# Patient Record
Sex: FEMALE | Race: WHITE | NOT HISPANIC OR LATINO | URBAN - METROPOLITAN AREA
[De-identification: names, ages, dates, MRNs, and addresses within clinical notes are randomized per-mention and may not be internally consistent; named-entity substitution may affect disease eponyms.]

---

## 2021-08-05 ENCOUNTER — WEB ENCOUNTER (OUTPATIENT)
Dept: URBAN - METROPOLITAN AREA CLINIC 74 | Facility: CLINIC | Age: 31
End: 2021-08-05

## 2021-08-05 ENCOUNTER — OFFICE VISIT (OUTPATIENT)
Dept: URBAN - METROPOLITAN AREA CLINIC 74 | Facility: CLINIC | Age: 31
End: 2021-08-05
Payer: COMMERCIAL

## 2021-08-05 VITALS
HEART RATE: 136 BPM | SYSTOLIC BLOOD PRESSURE: 150 MMHG | HEIGHT: 65 IN | DIASTOLIC BLOOD PRESSURE: 100 MMHG | TEMPERATURE: 98.2 F | BODY MASS INDEX: 21.52 KG/M2 | OXYGEN SATURATION: 93 % | WEIGHT: 129.2 LBS

## 2021-08-05 DIAGNOSIS — K30 POSTPRANDIAL DISTRESS SYNDROME: ICD-10-CM

## 2021-08-05 DIAGNOSIS — K29.30 CHRONIC SUPERFICIAL GASTRITIS WITHOUT BLEEDING: ICD-10-CM

## 2021-08-05 DIAGNOSIS — R10.13 EPIGASTRIC ABDOMINAL PAIN: ICD-10-CM

## 2021-08-05 DIAGNOSIS — K90.41 GLUTEN INTOLERANCE: ICD-10-CM

## 2021-08-05 PROCEDURE — 99203 OFFICE O/P NEW LOW 30 MIN: CPT | Performed by: INTERNAL MEDICINE

## 2021-08-05 RX ORDER — DICYCLOMINE HYDROCHLORIDE 10 MG/1
1 TABLET CAPSULE ORAL
Qty: 90 | Refills: 2 | OUTPATIENT
Start: 2021-08-05 | End: 2021-11-03

## 2021-08-05 RX ORDER — AMITRIPTYLINE HYDROCHLORIDE 25 MG/1
1 TABLET AT BEDTIME TABLET, FILM COATED ORAL ONCE A DAY
Qty: 30 | Refills: 1 | OUTPATIENT

## 2021-08-05 RX ORDER — PANTOPRAZOLE SODIUM 40 MG/1
1 TABLET TABLET, DELAYED RELEASE ORAL ONCE A DAY
Qty: 30 | Refills: 2 | OUTPATIENT
Start: 2021-08-05

## 2021-08-05 RX ORDER — SUCRALFATE 1 G/1
1 TABLET ON AN EMPTY STOMACH TABLET ORAL TWICE A DAY
Qty: 60 TABLET | Refills: 2 | OUTPATIENT
Start: 2021-08-05 | End: 2021-11-03

## 2021-08-05 RX ORDER — ONDANSETRON 4 MG/1
1 TABLET ON THE TONGUE AND ALLOW TO DISSOLVE TABLET, ORALLY DISINTEGRATING ORAL
Qty: 90 | Refills: 2 | OUTPATIENT
Start: 2021-08-05

## 2021-08-05 NOTE — PHYSICAL EXAM GASTROINTESTINAL
Abdomen , soft, TTP in epigastric region nondistended , no guarding or rigidity , no masses palpable , normal bowel sounds , Liver and Spleen , no hepatomegaly present , no hepatosplenomegaly , liver nontender , spleen not palpable

## 2021-08-05 NOTE — HPI-TODAY'S VISIT:
29 yo CF here for c/o abdominal pain associated with nausea. has GERD. symptoms get worse with gluten. gluten causes her to have rash/arthralgias. low FODMAP diet helps. has had negative celiac panel and duodenal biopsy. she had been avoiding gluten then. UGI series showed diverticula in the stomach. EGD showed gastritis last year. has lost almost 30 pounds unintentionally due to pain/nausea

## 2021-08-30 ENCOUNTER — LAB OUTSIDE AN ENCOUNTER (OUTPATIENT)
Dept: URBAN - METROPOLITAN AREA CLINIC 74 | Facility: CLINIC | Age: 31
End: 2021-08-30

## 2021-08-30 ENCOUNTER — TELEPHONE ENCOUNTER (OUTPATIENT)
Dept: URBAN - METROPOLITAN AREA CLINIC 74 | Facility: CLINIC | Age: 31
End: 2021-08-30

## 2021-08-30 PROBLEM — 274770006: Status: ACTIVE | Noted: 2021-08-30

## 2021-09-08 LAB
AAT, DNA ANALYSIS: (no result)
ACTIN (SMOOTH MUSCLE) ANTIBODY: 8
ADDITIONAL INFORMATION:: (no result)
ALBUMIN: 4.4
ALKALINE PHOSPHATASE: 97
ALT (SGPT): 30
ANA DIRECT: NEGATIVE
AST (SGOT): 37
BILIRUBIN, DIRECT: 0.21
BILIRUBIN, TOTAL: 0.9
CERULOPLASMIN: 19
DEAMIDATED GLIADIN ABS, IGA: 5
DEAMIDATED GLIADIN ABS, IGG: 2
ENDOMYSIAL ANTIBODY IGA: NEGATIVE
FERRITIN, SERUM: 41
HBSAG SCREEN: NEGATIVE
HEP A AB, IGM: NEGATIVE
HEP B CORE AB, IGM: NEGATIVE
HEP C VIRUS AB: 0.1
IMMUNOGLOBULIN A, QN, SERUM: 194
IRON BIND.CAP.(TIBC): 288
IRON SATURATION: 42
IRON: 120
Lab: (no result)
MITOCHONDRIAL (M2) ANTIBODY: <20
PROTEIN, TOTAL: 6.7
T-TRANSGLUTAMINASE (TTG) IGA: <2
T-TRANSGLUTAMINASE (TTG) IGG: 4
UIBC: 168

## 2021-09-14 ENCOUNTER — TELEPHONE ENCOUNTER (OUTPATIENT)
Dept: URBAN - METROPOLITAN AREA CLINIC 92 | Facility: CLINIC | Age: 31
End: 2021-09-14

## 2021-09-15 ENCOUNTER — OFFICE VISIT (OUTPATIENT)
Dept: URBAN - METROPOLITAN AREA TELEHEALTH 2 | Facility: TELEHEALTH | Age: 31
End: 2021-09-15
Payer: COMMERCIAL

## 2021-09-15 ENCOUNTER — TELEPHONE ENCOUNTER (OUTPATIENT)
Dept: URBAN - METROPOLITAN AREA CLINIC 92 | Facility: CLINIC | Age: 31
End: 2021-09-15

## 2021-09-15 ENCOUNTER — DASHBOARD ENCOUNTERS (OUTPATIENT)
Age: 31
End: 2021-09-15

## 2021-09-15 DIAGNOSIS — K29.30 CHRONIC SUPERFICIAL GASTRITIS WITHOUT BLEEDING: ICD-10-CM

## 2021-09-15 DIAGNOSIS — R10.13 EPIGASTRIC ABDOMINAL PAIN: ICD-10-CM

## 2021-09-15 DIAGNOSIS — K30 POSTPRANDIAL DISTRESS SYNDROME: ICD-10-CM

## 2021-09-15 DIAGNOSIS — K90.41 GLUTEN INTOLERANCE: ICD-10-CM

## 2021-09-15 PROBLEM — 196735001: Status: ACTIVE | Noted: 2021-08-05

## 2021-09-15 PROBLEM — 871745001: Status: ACTIVE | Noted: 2021-08-05

## 2021-09-15 PROBLEM — 441831003: Status: ACTIVE | Noted: 2021-08-05

## 2021-09-15 PROCEDURE — 99213 OFFICE O/P EST LOW 20 MIN: CPT | Performed by: INTERNAL MEDICINE

## 2021-09-15 RX ORDER — ONDANSETRON 4 MG/1
1 TABLET ON THE TONGUE AND ALLOW TO DISSOLVE TABLET, ORALLY DISINTEGRATING ORAL
Qty: 90 | Refills: 2 | Status: ACTIVE | COMMUNITY
Start: 2021-08-05

## 2021-09-15 RX ORDER — DICYCLOMINE HYDROCHLORIDE 10 MG/1
1 TABLET CAPSULE ORAL
Qty: 90 | Refills: 2 | Status: ON HOLD | COMMUNITY
Start: 2021-08-05 | End: 2021-11-03

## 2021-09-15 RX ORDER — ONDANSETRON 4 MG/1
1 TABLET ON THE TONGUE AND ALLOW TO DISSOLVE TABLET, ORALLY DISINTEGRATING ORAL
Qty: 90 | Refills: 2 | OUTPATIENT

## 2021-09-15 RX ORDER — PANTOPRAZOLE SODIUM 40 MG/1
1 TABLET TABLET, DELAYED RELEASE ORAL ONCE A DAY
Qty: 30 | Refills: 2 | OUTPATIENT

## 2021-09-15 RX ORDER — SUCRALFATE 1 G/1
1 TABLET ON AN EMPTY STOMACH TABLET ORAL TWICE A DAY
Qty: 60 TABLET | Refills: 2 | OUTPATIENT

## 2021-09-15 RX ORDER — PANTOPRAZOLE SODIUM 40 MG/1
1 TABLET TABLET, DELAYED RELEASE ORAL ONCE A DAY
Qty: 30 | Refills: 2 | Status: ON HOLD | COMMUNITY
Start: 2021-08-05

## 2021-09-15 RX ORDER — SUCRALFATE 1 G/1
1 TABLET ON AN EMPTY STOMACH TABLET ORAL TWICE A DAY
Qty: 60 TABLET | Refills: 2 | Status: ON HOLD | COMMUNITY
Start: 2021-08-05 | End: 2021-11-03

## 2021-09-15 RX ORDER — MIRTAZAPINE 15 MG/1
1 TABLET AT BEDTIME TABLET, FILM COATED ORAL ONCE A DAY
Qty: 30 | OUTPATIENT
Start: 2021-09-15

## 2021-09-15 RX ORDER — AMITRIPTYLINE HYDROCHLORIDE 25 MG/1
1 TABLET AT BEDTIME TABLET, FILM COATED ORAL ONCE A DAY
Qty: 30 | Refills: 1 | Status: ON HOLD | COMMUNITY

## 2021-09-15 NOTE — HPI-TODAY'S VISIT:
31 yo CF here for c/o abdominal pain associated with nausea. has GERD. symptoms get worse with gluten. gluten causes her to have rash/arthralgias. low FODMAP diet helps. has had negative celiac panel and duodenal biopsy. she had been avoiding gluten then. UGI series showed diverticula in the stomach. EGD showed gastritis last year. has lost almost 30 pounds unintentionally due to pain/nausea. recent bloodwork was normal. 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 multiple family members have it. has not tried carafate/protonix. does not want to take amitriptyline. willing to try mirtazapine. normal LFTs now. negative celiac panel